# Patient Record
Sex: FEMALE | Race: BLACK OR AFRICAN AMERICAN | URBAN - METROPOLITAN AREA
[De-identification: names, ages, dates, MRNs, and addresses within clinical notes are randomized per-mention and may not be internally consistent; named-entity substitution may affect disease eponyms.]

---

## 2024-08-07 LAB
CHOLESTEROL, TOTAL: 156 MG/DL
CHOLESTEROL/HDL RATIO: ABNORMAL
HDLC SERPL-MCNC: 38 MG/DL (ref 35–70)
LDL CHOLESTEROL: 105
NONHDLC SERPL-MCNC: ABNORMAL MG/DL
TRIGL SERPL-MCNC: 66 MG/DL
VLDLC SERPL CALC-MCNC: 13 MG/DL

## 2024-11-20 NOTE — PROGRESS NOTES
New patient here for establishing care today and discussing PCOS.   Patient wants to discuss her irregular menses most likely related to her PCOS per patient, weight gain, and facial hair growth. Patient has tried diet and exercise with little effect.   Patient has never tried any other treatment other than BC but that was back in 2017. BC kept menses regular but it was still heavy.     LAST PAP:  7/8/2022, neg.     LAST MAMMO:  8/8/2023     LMP:  Patient's last menstrual period was 11/14/2024 (exact date).    BIRTH CONTROL:  none-same sex partner     TOBACCO USE:  No    FAMILY HISTORY OF:   Breast Cancer:  No   Ovarian Cancer:  No   Uterine Cancer:  No   Colon Cancer:  No    Vitals:    11/21/24 0847   BP: 122/70   Site: Left Upper Arm   Position: Sitting   Weight: 91.6 kg (202 lb)   Height: 1.657 m (5' 5.25\")        SANDIP ARMENDARIZ RN  11/21/24  8:59 AM

## 2024-11-21 ENCOUNTER — FOLLOWUP TELEPHONE ENCOUNTER (OUTPATIENT)
Dept: DIABETES SERVICES | Age: 39
End: 2024-11-21

## 2024-11-21 ENCOUNTER — OFFICE VISIT (OUTPATIENT)
Dept: OBGYN CLINIC | Age: 39
End: 2024-11-21
Payer: MEDICAID

## 2024-11-21 VITALS
DIASTOLIC BLOOD PRESSURE: 70 MMHG | SYSTOLIC BLOOD PRESSURE: 122 MMHG | WEIGHT: 202 LBS | HEIGHT: 65 IN | BODY MASS INDEX: 33.66 KG/M2

## 2024-11-21 DIAGNOSIS — E28.2 PCOS (POLYCYSTIC OVARIAN SYNDROME): Primary | ICD-10-CM

## 2024-11-21 LAB
ALBUMIN SERPL-MCNC: 3.5 G/DL (ref 3.5–5)
ALBUMIN/GLOB SERPL: 0.9 (ref 1–1.9)
ALP SERPL-CCNC: 82 U/L (ref 35–104)
ALT SERPL-CCNC: 18 U/L (ref 8–45)
ANION GAP SERPL CALC-SCNC: 9 MMOL/L (ref 7–16)
AST SERPL-CCNC: 28 U/L (ref 15–37)
BILIRUB SERPL-MCNC: <0.2 MG/DL (ref 0–1.2)
BUN SERPL-MCNC: 12 MG/DL (ref 6–23)
CALCIUM SERPL-MCNC: 9 MG/DL (ref 8.8–10.2)
CHLORIDE SERPL-SCNC: 105 MMOL/L (ref 98–107)
CO2 SERPL-SCNC: 24 MMOL/L (ref 20–29)
CREAT SERPL-MCNC: 0.73 MG/DL (ref 0.6–1.1)
ERYTHROCYTE [DISTWIDTH] IN BLOOD BY AUTOMATED COUNT: 14.6 % (ref 11.9–14.6)
EST. AVERAGE GLUCOSE BLD GHB EST-MCNC: 126 MG/DL
ESTRADIOL SERPL-MCNC: 27.8 PG/ML
FSH SERPL-ACNC: 6.4 MIU/ML
GLOBULIN SER CALC-MCNC: 4 G/DL (ref 2.3–3.5)
GLUCOSE SERPL-MCNC: 98 MG/DL (ref 70–99)
HBA1C MFR BLD: 6 % (ref 0–5.6)
HCT VFR BLD AUTO: 33.2 % (ref 35.8–46.3)
HGB BLD-MCNC: 9.7 G/DL (ref 11.7–15.4)
MCH RBC QN AUTO: 24.4 PG (ref 26.1–32.9)
MCHC RBC AUTO-ENTMCNC: 29.2 G/DL (ref 31.4–35)
MCV RBC AUTO: 83.6 FL (ref 82–102)
NRBC # BLD: 0 K/UL (ref 0–0.2)
PLATELET # BLD AUTO: 364 K/UL (ref 150–450)
PMV BLD AUTO: 10.4 FL (ref 9.4–12.3)
POTASSIUM SERPL-SCNC: 4.4 MMOL/L (ref 3.5–5.1)
PROGEST SERPL-MCNC: 0.22 NG/ML
PROLACTIN SERPL-MCNC: 12.6 NG/ML (ref 4.8–23.3)
PROT SERPL-MCNC: 7.5 G/DL (ref 6.3–8.2)
RBC # BLD AUTO: 3.97 M/UL (ref 4.05–5.2)
SODIUM SERPL-SCNC: 138 MMOL/L (ref 136–145)
TSH W FREE THYROID IF ABNORMAL: 1.53 UIU/ML (ref 0.27–4.2)
WBC # BLD AUTO: 4.6 K/UL (ref 4.3–11.1)

## 2024-11-21 PROCEDURE — 99214 OFFICE O/P EST MOD 30 MIN: CPT | Performed by: NURSE PRACTITIONER

## 2024-11-21 RX ORDER — DROSPIRENONE AND ETHINYL ESTRADIOL 0.02-3(28)
1 KIT ORAL DAILY
Qty: 84 TABLET | Refills: 3 | Status: SHIPPED | OUTPATIENT
Start: 2024-11-21

## 2024-11-21 SDOH — ECONOMIC STABILITY: FOOD INSECURITY: WITHIN THE PAST 12 MONTHS, YOU WORRIED THAT YOUR FOOD WOULD RUN OUT BEFORE YOU GOT MONEY TO BUY MORE.: NEVER TRUE

## 2024-11-21 SDOH — ECONOMIC STABILITY: INCOME INSECURITY: HOW HARD IS IT FOR YOU TO PAY FOR THE VERY BASICS LIKE FOOD, HOUSING, MEDICAL CARE, AND HEATING?: NOT HARD AT ALL

## 2024-11-21 SDOH — ECONOMIC STABILITY: FOOD INSECURITY: WITHIN THE PAST 12 MONTHS, THE FOOD YOU BOUGHT JUST DIDN'T LAST AND YOU DIDN'T HAVE MONEY TO GET MORE.: NEVER TRUE

## 2024-11-21 ASSESSMENT — PATIENT HEALTH QUESTIONNAIRE - PHQ9
2. FEELING DOWN, DEPRESSED OR HOPELESS: NOT AT ALL
SUM OF ALL RESPONSES TO PHQ QUESTIONS 1-9: 0
1. LITTLE INTEREST OR PLEASURE IN DOING THINGS: NOT AT ALL
SUM OF ALL RESPONSES TO PHQ QUESTIONS 1-9: 0
SUM OF ALL RESPONSES TO PHQ9 QUESTIONS 1 & 2: 0

## 2024-11-21 NOTE — TELEPHONE ENCOUNTER
Called regarding referral for PCOS. Scheduled for virtual 1 hr session for preventing type 2 diabetes.

## 2024-11-21 NOTE — PROGRESS NOTES
Tana Frankel is a 39 y.o.  who is here today to discuss PCOS    PMHx: PCOS, pre-diabates, fibroids, and anemia    Dx with PCOS in  based off ultrasound, irregular periods and acne/unwante dhair growth. Also dx with 3 small fibroids at that time  Pt was started on OCPs but states she stopped as menses continued to be heavy. She subsequently achieved pregnancy  Currently sexually active - female partner  Menses first part of this year very irregular, maybe 2 total. Has been coming Q month, lasting 5-7 days for the last 3 months.    Has had trouble losing weight. Open to nutrition counseling    LAST PAP:  2022, neg.      LAST MAMMO:  2023       Patient's last menstrual period was 2024 (exact date).            Past Medical History:   Diagnosis Date    PCOS (polycystic ovarian syndrome) 2017    Uterine fibroid        Past Surgical History:   Procedure Laterality Date    BREAST REDUCTION SURGERY       SECTION         Family History   Problem Relation Age of Onset    Stroke Mother     Breast Cancer Neg Hx     Colon Cancer Neg Hx     Ovarian Cancer Neg Hx     Uterine Cancer Neg Hx        Social History     Socioeconomic History    Marital status: Single     Spouse name: Not on file    Number of children: Not on file    Years of education: Not on file    Highest education level: Not on file   Occupational History    Not on file   Tobacco Use    Smoking status: Never    Smokeless tobacco: Never   Vaping Use    Vaping status: Never Used   Substance and Sexual Activity    Alcohol use: Not Currently    Drug use: Never    Sexual activity: Not Currently     Partners: Female   Other Topics Concern    Not on file   Social History Narrative    Patient states feeling safe at home, denies hx of abuse.      Social Determinants of Health     Financial Resource Strain: Low Risk  (2024)    Overall Financial Resource Strain (CARDIA)     Difficulty of Paying Living Expenses: Not hard at all   Food

## 2024-11-21 NOTE — ASSESSMENT & PLAN NOTE
Dx 2021 based on ultrasound, irregular periods and acne/unwanted hair growth    Lengthy D/W pt about diagnosis, etiology and treatment options, including but not limited to: weight gain, anovulation, hirsutism, effect on fertility, increased risks for CAD and endometrial hyperplasia.    Encouraged increase in exercise and recommended low sugar/carb diet.    Fasting labs today - consider inositol/metformin if indicated  Pt ok with starting OCPs, rx sent  Patient denies h/o DVT, stroke, MI, migraines, liver disease or HTN  Consider starting aldactone if no improvement in acne/hair growth

## 2024-11-21 NOTE — PROGRESS NOTES
I have reviewed the patient's visit today including history, exam and assessment by FEMI Anna.  I agree with treatment/plan as above.    Dell Dalton MD  9:33 AM  11/21/24

## 2024-11-22 ENCOUNTER — TELEPHONE (OUTPATIENT)
Dept: OBGYN CLINIC | Age: 39
End: 2024-11-22

## 2024-11-22 LAB
DHEA-S SERPL-MCNC: 73.6 UG/DL (ref 57.3–279.2)
INSULIN SERPL-ACNC: 17.4 UIU/ML (ref 2.6–24.9)

## 2024-11-22 NOTE — TELEPHONE ENCOUNTER
So far labs normal except she si prediabetic and anemic    For anemia, start     FeSO4 325mg PO ONCE DAILY Disp: 30 RF:2  Colace 100mg PO BID Disp: 60 RF: 2 prn for constipation      Recommend taking iron on empty stomach or with foods rich in Vitamin C     Encourage foods that are high in iron (I.e. red meats, green leafy vegetables, peanut butter)    Also increase fluids and foods high in fiber to prevent constipation.     2. For prediabetes, I see she has scheduled class with dietician.recommend increasing exercise to 4-5 days/week. We can also try medication such as metformin. Let me know if she would like to try

## 2024-11-23 LAB
TESTOST FREE SERPL-MCNC: 0.5 PG/ML (ref 0–4.2)
TESTOST SERPL-MCNC: 19 NG/DL (ref 8–60)

## 2024-11-25 LAB — 17OHP SERPL-MCNC: 58 NG/DL

## 2024-11-25 RX ORDER — DOCUSATE SODIUM 100 MG/1
100 CAPSULE, LIQUID FILLED ORAL 2 TIMES DAILY PRN
Qty: 60 CAPSULE | Refills: 2 | Status: SHIPPED | OUTPATIENT
Start: 2024-11-25

## 2024-11-25 RX ORDER — FERROUS SULFATE 325(65) MG
325 TABLET ORAL DAILY
Qty: 30 TABLET | Refills: 2 | Status: SHIPPED | OUTPATIENT
Start: 2024-11-25

## 2024-11-25 NOTE — TELEPHONE ENCOUNTER
Pt sent MyChart message stating the Metformin was not sent in.     In my previous message I stated she wanted the Metformin

## 2024-11-25 NOTE — TELEPHONE ENCOUNTER
Pt sent [facility] message regarding results. Message below reviewed with pt, pt stated understanding and would like metformin.    Please advise   Rx pend

## 2024-11-26 RX ORDER — METFORMIN HYDROCHLORIDE 500 MG/1
500 TABLET, EXTENDED RELEASE ORAL 2 TIMES DAILY WITH MEALS
Qty: 60 TABLET | Refills: 11 | Status: SHIPPED | OUTPATIENT
Start: 2024-11-26

## 2024-12-10 ENCOUNTER — TELEPHONE (OUTPATIENT)
Dept: DIABETES SERVICES | Age: 39
End: 2024-12-10

## 2024-12-10 NOTE — TELEPHONE ENCOUNTER
Pre Diabetes.  Patient no show for free Diabetes education. It is a Pre Diabetes virtual session.  Called and unable to speak to patient.  Left message for patient to call back at [phone] or [phone].

## 2025-02-24 RX ORDER — FERROUS SULFATE 325(65) MG
1 TABLET ORAL DAILY
Qty: 30 TABLET | Refills: 2 | OUTPATIENT
Start: 2025-02-24

## 2025-03-12 ENCOUNTER — PROCEDURE VISIT (OUTPATIENT)
Dept: OBGYN CLINIC | Age: 40
End: 2025-03-12
Payer: MEDICAID

## 2025-03-12 ENCOUNTER — OFFICE VISIT (OUTPATIENT)
Dept: OBGYN CLINIC | Age: 40
End: 2025-03-12
Payer: MEDICAID

## 2025-03-12 VITALS
HEIGHT: 62 IN | SYSTOLIC BLOOD PRESSURE: 106 MMHG | WEIGHT: 191 LBS | BODY MASS INDEX: 35.15 KG/M2 | DIASTOLIC BLOOD PRESSURE: 62 MMHG

## 2025-03-12 DIAGNOSIS — E28.2 PCOS (POLYCYSTIC OVARIAN SYNDROME): Primary | ICD-10-CM

## 2025-03-12 DIAGNOSIS — D21.9 FIBROIDS: ICD-10-CM

## 2025-03-12 DIAGNOSIS — Z12.31 SCREENING MAMMOGRAM FOR BREAST CANCER: ICD-10-CM

## 2025-03-12 DIAGNOSIS — E28.2 PCOS (POLYCYSTIC OVARIAN SYNDROME): ICD-10-CM

## 2025-03-12 DIAGNOSIS — Z01.419 WOMEN'S ANNUAL ROUTINE GYNECOLOGICAL EXAMINATION: ICD-10-CM

## 2025-03-12 DIAGNOSIS — N92.0 MENORRHAGIA WITH REGULAR CYCLE: ICD-10-CM

## 2025-03-12 DIAGNOSIS — D64.9 ANEMIA, UNSPECIFIED TYPE: ICD-10-CM

## 2025-03-12 DIAGNOSIS — Z12.4 PAP SMEAR FOR CERVICAL CANCER SCREENING: ICD-10-CM

## 2025-03-12 LAB
ALBUMIN SERPL-MCNC: 3.4 G/DL (ref 3.5–5)
ALBUMIN/GLOB SERPL: 0.9 (ref 1–1.9)
ALP SERPL-CCNC: 71 U/L (ref 35–104)
ALT SERPL-CCNC: 19 U/L (ref 8–45)
ANION GAP SERPL CALC-SCNC: 9 MMOL/L (ref 7–16)
AST SERPL-CCNC: 20 U/L (ref 15–37)
BILIRUB SERPL-MCNC: 0.2 MG/DL (ref 0–1.2)
BUN SERPL-MCNC: 11 MG/DL (ref 6–23)
CALCIUM SERPL-MCNC: 9.2 MG/DL (ref 8.8–10.2)
CHLORIDE SERPL-SCNC: 104 MMOL/L (ref 98–107)
CO2 SERPL-SCNC: 23 MMOL/L (ref 20–29)
CREAT SERPL-MCNC: 0.76 MG/DL (ref 0.6–1.1)
ERYTHROCYTE [DISTWIDTH] IN BLOOD BY AUTOMATED COUNT: 17.9 % (ref 11.9–14.6)
FERRITIN SERPL-MCNC: 13 NG/ML (ref 8–388)
GLOBULIN SER CALC-MCNC: 3.9 G/DL (ref 2.3–3.5)
GLUCOSE SERPL-MCNC: 96 MG/DL (ref 70–99)
HCT VFR BLD AUTO: 35.9 % (ref 35.8–46.3)
HGB BLD-MCNC: 10.9 G/DL (ref 11.7–15.4)
HGB RETIC QN AUTO: 28 PG (ref 29–35)
IMM RETICS NFR: 17.6 % (ref 3–15.9)
IRON SATN MFR SERPL: 20 % (ref 20–50)
IRON SERPL-MCNC: 73 UG/DL (ref 35–100)
MCH RBC QN AUTO: 24.2 PG (ref 26.1–32.9)
MCHC RBC AUTO-ENTMCNC: 30.4 G/DL (ref 31.4–35)
MCV RBC AUTO: 79.8 FL (ref 82–102)
NRBC # BLD: 0 K/UL (ref 0–0.2)
PLATELET # BLD AUTO: 354 K/UL (ref 150–450)
PMV BLD AUTO: 11.3 FL (ref 9.4–12.3)
POTASSIUM SERPL-SCNC: 4.1 MMOL/L (ref 3.5–5.1)
PROT SERPL-MCNC: 7.3 G/DL (ref 6.3–8.2)
RBC # BLD AUTO: 4.5 M/UL (ref 4.05–5.2)
RETICS # AUTO: 0.06 M/UL (ref 0.03–0.1)
RETICS/RBC NFR AUTO: 1.3 % (ref 0.3–2)
SODIUM SERPL-SCNC: 137 MMOL/L (ref 136–145)
TIBC SERPL-MCNC: 363 UG/DL (ref 240–450)
UIBC SERPL-MCNC: 290 UG/DL (ref 112–347)
WBC # BLD AUTO: 5.7 K/UL (ref 4.3–11.1)

## 2025-03-12 PROCEDURE — 99213 OFFICE O/P EST LOW 20 MIN: CPT | Performed by: NURSE PRACTITIONER

## 2025-03-12 PROCEDURE — 99396 PREV VISIT EST AGE 40-64: CPT | Performed by: NURSE PRACTITIONER

## 2025-03-12 PROCEDURE — 76830 TRANSVAGINAL US NON-OB: CPT | Performed by: OBSTETRICS & GYNECOLOGY

## 2025-03-12 RX ORDER — DROSPIRENONE AND ETHINYL ESTRADIOL 0.02-3(28)
1 KIT ORAL DAILY
Qty: 84 TABLET | Refills: 3 | Status: SHIPPED | OUTPATIENT
Start: 2025-03-12

## 2025-03-12 RX ORDER — SPIRONOLACTONE 50 MG/1
50 TABLET, FILM COATED ORAL DAILY
Qty: 90 TABLET | Refills: 3 | Status: SHIPPED | OUTPATIENT
Start: 2025-03-12

## 2025-03-12 ASSESSMENT — PATIENT HEALTH QUESTIONNAIRE - PHQ9
SUM OF ALL RESPONSES TO PHQ QUESTIONS 1-9: 0
2. FEELING DOWN, DEPRESSED OR HOPELESS: NOT AT ALL
1. LITTLE INTEREST OR PLEASURE IN DOING THINGS: NOT AT ALL
SUM OF ALL RESPONSES TO PHQ QUESTIONS 1-9: 0

## 2025-03-12 NOTE — PROGRESS NOTES
Chaperone for Intimate Exam     Chaperone was offer accepted as part of the rooming process    Chaperone: Ailyn Toro

## 2025-03-12 NOTE — ASSESSMENT & PLAN NOTE
11/21/24:  Dx 2021 based on ultrasound, irregular periods and acne/unwanted hair growth     Lengthy D/W pt about diagnosis, etiology and treatment options, including but not limited to: weight gain, anovulation, hirsutism, effect on fertility, increased risks for CAD and endometrial hyperplasia.    Encouraged increase in exercise and recommended low sugar/carb diet.    Fasting labs today - consider inositol/metformin if indicated  Pt ok with starting OCPs, rx sent  Patient denies h/o DVT, stroke, MI, migraines, liver disease or HTN  Consider starting aldactone if no improvement in acne/hair growth    3/12/25: doing well with OCPs, will trial continuous dosing  Stopped metformin due to GI upset, will trial ovasitol and f/u with PCP to discuss glp-1  Rx sent for aldactone, will recheck K in 2-3 months

## 2025-03-12 NOTE — PROGRESS NOTES
Tana Frankel is a 40 y.o.      2024: here today to discuss PCOS     PMHx: PCOS, pre-diabates, fibroids, and anemia     Dx with PCOS in  based off ultrasound, irregular periods and acne/unwante dhair growth. Also dx with 3 small fibroids at that time  Pt was started on OCPs but states she stopped as menses continued to be heavy. She subsequently achieved pregnancy  Currently sexually active - female partner  Menses first part of this year very irregular, maybe 2 total. Has been coming Q month, lasting 5-7 days for the last 3 months.     Has had trouble losing weight. Open to nutrition counseling     LAST PAP:  2022, neg.      LAST MAMMO:  2023       3/12/25: here today for US and AE  Started Ocps after last visit. Menses have been lighter  Could not tolerate metformin once daily due to GI upset so she stopped    Patient's last menstrual period was 2025 (approximate).        Past Medical History:   Diagnosis Date    PCOS (polycystic ovarian syndrome) 2017    Uterine fibroid        Past Surgical History:   Procedure Laterality Date    BREAST REDUCTION SURGERY       SECTION         Family History   Problem Relation Age of Onset    Stroke Mother     Breast Cancer Neg Hx     Colon Cancer Neg Hx     Ovarian Cancer Neg Hx     Uterine Cancer Neg Hx        Social History     Socioeconomic History    Marital status: Single     Spouse name: Not on file    Number of children: Not on file    Years of education: Not on file    Highest education level: Not on file   Occupational History    Not on file   Tobacco Use    Smoking status: Never    Smokeless tobacco: Never   Vaping Use    Vaping status: Never Used   Substance and Sexual Activity    Alcohol use: Not Currently    Drug use: Never    Sexual activity: Not Currently     Partners: Female   Other Topics Concern    Not on file   Social History Narrative    Patient states feeling safe at home, denies hx of abuse.      Social Drivers of

## 2025-03-12 NOTE — PROGRESS NOTES
Patient here for AE and US regarding PCOS.   Patient stopped taking metformin around beginning of 02/2025 due to increase GI upset.     Patient would like to discuss starting aldactone.      Patient states she is doing okay on OCP, periods have become somewhat lighter per patient.     LAST PAP:  7/8/2022, neg.     LAST MAMMO:  8/8/2023     LMP:  Patient's last menstrual period was 02/26/2025 (approximate).    BIRTH CONTROL:  OCPs-same sex partner     TOBACCO USE:  No    FAMILY HISTORY OF:   Breast Cancer:  No   Ovarian Cancer:  No   Uterine Cancer:  No   Colon Cancer:  No    Vitals:    03/12/25 1310   BP: 106/62   BP Site: Right Upper Arm   Patient Position: Sitting   Weight: 86.6 kg (191 lb)   Height: 1.581 m (5' 2.25\")        SANDIP ARMENDARIZ RN  03/12/25  1:16 PM

## 2025-03-12 NOTE — ASSESSMENT & PLAN NOTE
US today confirms known fibroids, 6 measured largest approx 3 cm    We discuss today fibroid uterus, etiology, pathyophysiology and treatment for fibroids    Multiple medical and surgical treatment options discussed including expectant management, OCP's, provera, Nuvaring, Patch, Nexplanon, Depo, IUD, GnRH antagonist, UAE, myomectomy, and hysterectomy. Risks, benefits, SE's reviewed including but not limited to H/A, N/V, thromboembolic events, bleeding patterns, weight gain, efficacy, menopausal symptoms and risks of surgery.     Continue OCPs, will try continuous dosing  Anemia recheck today

## 2025-03-13 ENCOUNTER — RESULTS FOLLOW-UP (OUTPATIENT)
Dept: OBGYN CLINIC | Age: 40
End: 2025-03-13

## 2025-03-13 NOTE — PROGRESS NOTES
I have reviewed the patient's visit today including history, exam and assessment by FEMI Anna.  I agree with treatment/plan as above.    Dell Dalton MD  8:22 AM  03/13/25

## 2025-03-17 LAB
COLLECTION METHOD: NORMAL
CYTOLOGIST CVX/VAG CYTO: NORMAL
CYTOLOGY CVX/VAG DOC THIN PREP: NORMAL
DATE OF LMP: NORMAL
HPV APTIMA: NEGATIVE
HPV GENOTYPE REFLEX: NORMAL
Lab: NORMAL
PAP SOURCE: NORMAL
PATH REPORT.FINAL DX SPEC: NORMAL
STAT OF ADQ CVX/VAG CYTO-IMP: NORMAL

## 2025-03-25 ENCOUNTER — TELEPHONE (OUTPATIENT)
Dept: OBGYN CLINIC | Age: 40
End: 2025-03-25

## 2025-03-25 DIAGNOSIS — Z76.89 ENCOUNTER TO ESTABLISH CARE: Primary | ICD-10-CM

## 2025-04-11 PROBLEM — Z01.419 WOMEN'S ANNUAL ROUTINE GYNECOLOGICAL EXAMINATION: Status: RESOLVED | Noted: 2025-03-12 | Resolved: 2025-04-11

## 2025-04-18 ENCOUNTER — PATIENT MESSAGE (OUTPATIENT)
Dept: PRIMARY CARE CLINIC | Facility: CLINIC | Age: 40
End: 2025-04-18

## 2025-04-18 ENCOUNTER — OFFICE VISIT (OUTPATIENT)
Dept: PRIMARY CARE CLINIC | Facility: CLINIC | Age: 40
End: 2025-04-18

## 2025-04-18 VITALS
WEIGHT: 189 LBS | HEART RATE: 89 BPM | SYSTOLIC BLOOD PRESSURE: 102 MMHG | TEMPERATURE: 97.2 F | BODY MASS INDEX: 31.49 KG/M2 | DIASTOLIC BLOOD PRESSURE: 60 MMHG | HEIGHT: 65 IN | OXYGEN SATURATION: 99 %

## 2025-04-18 DIAGNOSIS — E78.5 DYSLIPIDEMIA (HIGH LDL; LOW HDL): ICD-10-CM

## 2025-04-18 DIAGNOSIS — R73.03 PREDIABETES: ICD-10-CM

## 2025-04-18 DIAGNOSIS — E28.2 PCOS (POLYCYSTIC OVARIAN SYNDROME): ICD-10-CM

## 2025-04-18 DIAGNOSIS — D50.8 IRON DEFICIENCY ANEMIA SECONDARY TO INADEQUATE DIETARY IRON INTAKE: ICD-10-CM

## 2025-04-18 DIAGNOSIS — E66.811 CLASS 1 OBESITY WITH SERIOUS COMORBIDITY AND BODY MASS INDEX (BMI) OF 31.0 TO 31.9 IN ADULT, UNSPECIFIED OBESITY TYPE: ICD-10-CM

## 2025-04-18 DIAGNOSIS — Z12.31 ENCOUNTER FOR SCREENING MAMMOGRAM FOR BREAST CANCER: ICD-10-CM

## 2025-04-18 DIAGNOSIS — Z76.89 ENCOUNTER TO ESTABLISH CARE: ICD-10-CM

## 2025-04-18 DIAGNOSIS — Z00.00 ANNUAL PHYSICAL EXAM: Primary | ICD-10-CM

## 2025-04-18 PROBLEM — D21.9 FIBROIDS: Status: ACTIVE | Noted: 2017-03-18

## 2025-04-18 PROBLEM — Z86.32 HISTORY OF GESTATIONAL DIABETES: Status: ACTIVE | Noted: 2022-08-22

## 2025-04-18 PROBLEM — G47.00 INSOMNIA: Status: ACTIVE | Noted: 2020-03-18

## 2025-04-18 RX ORDER — PHENTERMINE HYDROCHLORIDE 37.5 MG/1
37.5 TABLET ORAL
Qty: 30 TABLET | Refills: 0 | Status: SHIPPED | OUTPATIENT
Start: 2025-04-18 | End: 2025-05-18

## 2025-04-18 SDOH — ECONOMIC STABILITY: FOOD INSECURITY: WITHIN THE PAST 12 MONTHS, THE FOOD YOU BOUGHT JUST DIDN'T LAST AND YOU DIDN'T HAVE MONEY TO GET MORE.: NEVER TRUE

## 2025-04-18 SDOH — ECONOMIC STABILITY: FOOD INSECURITY: WITHIN THE PAST 12 MONTHS, YOU WORRIED THAT YOUR FOOD WOULD RUN OUT BEFORE YOU GOT MONEY TO BUY MORE.: NEVER TRUE

## 2025-04-18 NOTE — PATIENT INSTRUCTIONS
IT WAS GREAT TO MEET YOU TODAY!    PLEASE TAKE ALL MEDICATION AS DISCUSSED.   - ADIPEX EVERY MORNING BEFORE BREAKFAST TO ASSIST WITH WEIGHT. CONTINUE DIET AND EXERCISE.   - IF APPROVED, ZEPBOUND ONCE WEEKLY INJECTION FOR OBESITY WITH COMORBIDITIES, INCLUDING DYSLIPIDEMIA, PCOS, AND PREDIABETES  - DAILY CENTRUM MULTIVITAMIN ADVISED. CONTINUE IRON SUPPLEMENTS  - CONTINUE BRIAN AS DIRECTED  - TRY MELATONIN OVER THE COUNTER FOR SLEEP. LET ME KNOW, IF THAT DOESN'T HELP WITH YOUR SLEEP.    DRINK LOTS OF WATER. WEAR YOUR SEATBELT. FOCUS ON FRESH FRUITS AND VEGGIES DAILY, AND LEAN MEATS LIKE CHICKEN OR FISH. AVOID FRIED, FATTY FOODS, BREAD, PASTA, SALT AND \"ADDED\" SUGAR, ESPECIALLY JUICES AND SODA. AVOID TOBACCO AND EXCESSIVE ALCOHOL. EXERCISE AT LEAST 5 TIMES A WEEK FOR AT LEAST 30 MINUTES AT A TIME. KEEP A CONSISTENT SLEEP SCHEDULE.     MAMMOGRAM ORDERED. PLEASE LET US KNOW, IF YOU DO NOT HEAR FROM THEM IN NEXT TWO WEEKS.      WE WILL SEE YOU AGAIN AT YOUR NEXT APPOINTMENT WITH ME IN 1 MONTH BUT PLEASE SEND HESKA MESSAGE OR CALL WITH CONCERNS [phone]

## 2025-04-18 NOTE — PROGRESS NOTES
[facility]  Estrellita \"Jaja\" TOMMY Melgar  [address], [place] [address]  [phone]          ASSESSMENT AND PLAN    Problem List Items Addressed This Visit       PCOS (polycystic ovarian syndrome)    Relevant Medications    tirzepatide-weight management (ZEPBOUND) 2.5 MG/0.5ML SOAJ subCUTAneous auto-injector pen    Iron deficiency anemia secondary to inadequate dietary iron intake    Dyslipidemia (high LDL; low HDL)    Relevant Medications    tirzepatide-weight management (ZEPBOUND) 2.5 MG/0.5ML SOAJ subCUTAneous auto-injector pen    Encounter to establish care - Primary    Prediabetes    Relevant Medications    tirzepatide-weight management (ZEPBOUND) 2.5 MG/0.5ML SOAJ subCUTAneous auto-injector pen    Encounter for screening mammogram for breast cancer    Relevant Orders    [facility] DIGITAL SCREEN BILATERAL [[id number]]    Class 1 obesity with serious comorbidity and body mass index (BMI) of 31.0 to 31.9 in adult    Relevant Medications    phentermine (ADIPEX-P) 37.5 MG tablet    tirzepatide-weight management (ZEPBOUND) 2.5 MG/0.5ML SOAJ subCUTAneous auto-injector pen        Assessment & Plan  1. Insomnia.  - Reports difficulty sleeping, getting only about 2 hours of sleep per night for the past 4-5 years.  - Melatonin or magnesium supplements are recommended to help regulate her sleep cycle.  - If these do not help, she should inform the office for further treatment options.    2. Polycystic Ovary Syndrome (PCOS).  - Currently on birth control and iron supplements for PCOS.  - Previously tried metformin but discontinued due to gastrointestinal side effects.    3. Anemia.  - Recent CBC indicates persistent anemia.  - Currently taking iron supplements.  - A daily multivitamin without iron, such as Centrum, is recommended to help supplement any missing nutrients.    4. Dyslipidemia.  - Lipid panel shows slightly elevated LDL and low HDL levels.  - Weight loss and

## 2025-05-12 DIAGNOSIS — E28.2 PCOS (POLYCYSTIC OVARIAN SYNDROME): Primary | ICD-10-CM

## 2025-05-18 PROBLEM — Z00.00 ANNUAL PHYSICAL EXAM: Status: RESOLVED | Noted: 2025-04-18 | Resolved: 2025-05-18

## 2025-05-18 PROBLEM — Z12.31 ENCOUNTER FOR SCREENING MAMMOGRAM FOR BREAST CANCER: Status: RESOLVED | Noted: 2025-04-18 | Resolved: 2025-05-18
